# Patient Record
Sex: MALE | Race: WHITE
[De-identification: names, ages, dates, MRNs, and addresses within clinical notes are randomized per-mention and may not be internally consistent; named-entity substitution may affect disease eponyms.]

---

## 2020-03-19 ENCOUNTER — HOSPITAL ENCOUNTER (EMERGENCY)
Dept: HOSPITAL 8 - ED | Age: 55
LOS: 1 days | Discharge: HOME | End: 2020-03-20
Payer: COMMERCIAL

## 2020-03-19 VITALS — DIASTOLIC BLOOD PRESSURE: 92 MMHG | SYSTOLIC BLOOD PRESSURE: 157 MMHG

## 2020-03-19 VITALS — WEIGHT: 255.52 LBS | HEIGHT: 72 IN | BODY MASS INDEX: 34.61 KG/M2

## 2020-03-19 DIAGNOSIS — R10.9: ICD-10-CM

## 2020-03-19 DIAGNOSIS — K40.90: Primary | ICD-10-CM

## 2020-03-19 PROCEDURE — 76870 US EXAM SCROTUM: CPT

## 2020-03-19 PROCEDURE — 93975 VASCULAR STUDY: CPT

## 2020-03-19 PROCEDURE — 99284 EMERGENCY DEPT VISIT MOD MDM: CPT

## 2020-03-19 NOTE — NUR
THIS IS A 53 YO M W/ C/O RT GROIN PAIN 9/10 SINCE 3/1/20. PT REPORTS HE WAS 
LIFTING BOXES AT WORK WHEN IT STARTED. HAS GOTTEN PROGRESSIVELY WORSE. PT 
AMBULATED TO THE ROOM W/ A STEADY GAIT. VS STABLE. NADN. PT IS RESTING ON 
GURNEY IN GOWN W/ CALL LIGHT IN REACH AWAITING US.

## 2020-03-20 ENCOUNTER — OCCUPATIONAL MEDICINE (OUTPATIENT)
Dept: URGENT CARE | Facility: CLINIC | Age: 55
End: 2020-03-20
Payer: COMMERCIAL

## 2020-03-20 VITALS
TEMPERATURE: 98.7 F | SYSTOLIC BLOOD PRESSURE: 138 MMHG | HEIGHT: 72 IN | OXYGEN SATURATION: 95 % | BODY MASS INDEX: 34.64 KG/M2 | HEART RATE: 94 BPM | WEIGHT: 255.73 LBS | DIASTOLIC BLOOD PRESSURE: 82 MMHG

## 2020-03-20 DIAGNOSIS — K40.90 RIGHT INGUINAL HERNIA: ICD-10-CM

## 2020-03-20 PROCEDURE — 99204 OFFICE O/P NEW MOD 45 MIN: CPT | Performed by: PREVENTIVE MEDICINE

## 2020-03-20 NOTE — LETTER
[facility]  [address] [facility], [address] [place] [address]  Phone:  [phone] - Fax:  [phone]   [facility] Progress Report and Disability Certification  Date of Service: 3/20/2020   No Show:  No  Date / Time of Next Visit: 4/14/2020 @ 11:15 am   Claim Information   Patient Name: Tej Ricks  Claim Number:     Employer: [employer]  Date of Injury: 3/1/2020     Insurer / TPA: [employer]  ID / SSN:     Occupation: FULLFILLMENT SPECIALIST  Diagnosis: The encounter diagnosis was Right inguinal hernia.    Medical Information   Related to Industrial Injury? Yes    Subjective Complaints:  DOI 3/1/2020: 53 yo male presents with groin strain.  He is picking up austin litter and felt sudden pain in the groin.  He states he thought the pain would eventually go away but continue to worsen over the next week or 2.  Eventually presented to [facility], where ultrasound was performed and showed right inguinal hernia per patient report.  Patient continues in the right groin pain and some bulging.  Pain worse with bending and twisting.   Objective Findings: Abdomen: Bowel sounds x4.  Tenderness in the right inguinal canal, small defect felt.    Pre-Existing Condition(s):     Assessment:   Condition Same    Status: Additional Care Required  Permanent Disability:No    Plan:      Diagnostics:      Comments:  Referral to general surgery  OTC ibuprofen/Tylenol as needed for pain  Restricted duty  Follow-up 3 weeks    Disability Information   Status: Released to Restricted Duty    From:  3/20/2020  Through: 4/14/2020 Restrictions are: Temporary   Physical Restrictions   Sitting:    Standing:    Stooping:  < or = to 1 hr/day Bending:  < or = to 1 hr/day   Squatting:    Walking:    Climbing:    Pushing:      Pulling:    Other:    Reaching Above Shoulder (L):   Reaching Above Shoulder (R):       Reaching Below Shoulder (L):    Reaching Below Shoulder (R):      Not to  exceed Weight Limits   Carrying(hrs):   Weight Limit(lb): < or = to 10 pounds Lifting(hrs):   Weight  Limit(lb): < or = to 10 pounds   Comments:      Repetitive Actions   Hands: i.e. Fine Manipulations from Grasping:     Feet: i.e. Operating Foot Controls:     Driving / Operate Machinery:     Physician Name: Johan Morgan D.O. Physician Signature: wardSignJOHAN MORGAN D.O. e-Signature: Dr. Denver Meek, Medical Director   Clinic Name / Location: [address], [address] [place] [address] Clinic Phone Number: Dept: [phone]   Appointment Time: 2:45 Pm Visit Start Time: 2:23 PM   Check-In Time:  2:19 Pm Visit Discharge Time: 2:48 pm   Original-Treating Physician or Chiropractor    Page 2-Insurer/TPA    Page 3-Employer    Page 4-Employee

## 2020-03-20 NOTE — PROGRESS NOTES
Subjective:      Tej Ricks is a 54 y.o. male who presents with Other (WC DOI 3/1/20 groin, feeling worse. room 5)      DOI 3/1/2020: 55 yo male presents with groin strain.  He is picking up austin litter and felt sudden pain in the groin.  He states he thought the pain would eventually go away but continue to worsen over the next week or 2.  Eventually presented to [facility], where ultrasound was performed and showed right inguinal hernia per patient report.  Patient continues in the right groin pain and some bulging.  Pain worse with bending and twisting.     HPI    ROS  ROS: All systems were reviewed on intake form, form was reviewed and signed. See scanned documents in media. Pertinent positives and negatives included in HPI.    PMH: No pertinent past medical history to this problem  MEDS: Medications were reviewed in Epic  ALLERGIES:   Allergies   Allergen Reactions   • Nkda [No Known Drug Allergy]      SOCHX: Works as a  at [email or web address]  FH: No pertinent family history to this problem     Objective:     /82   Pulse 94   Temp 37.1 °C (98.7 °F)   Ht 1.829 m (6')   Wt 116 kg (255 lb 11.7 oz)   SpO2 95%   BMI 34.68 kg/m²      Physical Exam  Constitutional:       Appearance: Normal appearance.   HENT:      Right Ear: External ear normal.      Left Ear: External ear normal.      Nose: Nose normal.      Mouth/Throat:      Mouth: Mucous membranes are moist.      Pharynx: Oropharynx is clear.   Cardiovascular:      Rate and Rhythm: Normal rate.   Pulmonary:      Effort: Pulmonary effort is normal.   Skin:     General: Skin is warm and dry.   Neurological:      General: No focal deficit present.      Mental Status: He is alert and oriented to person, place, and time.   Psychiatric:         Mood and Affect: Mood normal.         Thought Content: Thought content normal.         Judgment: Judgment normal.         Abdomen: Bowel sounds x4.  Tenderness in the right inguinal canal, small  defect felt.        Assessment/Plan:       1. Right inguinal hernia  Referral to general surgery  OTC ibuprofen/Tylenol as needed for pain  Restricted duty  Follow-up 3 weeks

## 2020-04-02 PROCEDURE — 99284 EMERGENCY DEPT VISIT MOD MDM: CPT

## 2020-04-03 ENCOUNTER — APPOINTMENT (OUTPATIENT)
Dept: RADIOLOGY | Facility: MEDICAL CENTER | Age: 55
End: 2020-04-03
Attending: EMERGENCY MEDICINE
Payer: COMMERCIAL

## 2020-04-03 ENCOUNTER — HOSPITAL ENCOUNTER (EMERGENCY)
Facility: MEDICAL CENTER | Age: 55
End: 2020-04-03
Attending: EMERGENCY MEDICINE
Payer: COMMERCIAL

## 2020-04-03 VITALS
SYSTOLIC BLOOD PRESSURE: 135 MMHG | DIASTOLIC BLOOD PRESSURE: 76 MMHG | RESPIRATION RATE: 16 BRPM | OXYGEN SATURATION: 95 % | TEMPERATURE: 98.4 F | WEIGHT: 255.95 LBS | HEART RATE: 74 BPM | HEIGHT: 72 IN | BODY MASS INDEX: 34.67 KG/M2

## 2020-04-03 DIAGNOSIS — K40.90 RIGHT INGUINAL HERNIA: ICD-10-CM

## 2020-04-03 PROCEDURE — A9270 NON-COVERED ITEM OR SERVICE: HCPCS | Performed by: EMERGENCY MEDICINE

## 2020-04-03 PROCEDURE — 76870 US EXAM SCROTUM: CPT

## 2020-04-03 PROCEDURE — 700102 HCHG RX REV CODE 250 W/ 637 OVERRIDE(OP): Performed by: EMERGENCY MEDICINE

## 2020-04-03 RX ORDER — OXYCODONE HYDROCHLORIDE AND ACETAMINOPHEN 5; 325 MG/1; MG/1
1-2 TABLET ORAL EVERY 4 HOURS PRN
Qty: 15 TAB | Refills: 0 | Status: SHIPPED | OUTPATIENT
Start: 2020-04-03 | End: 2020-04-06

## 2020-04-03 RX ORDER — IBUPROFEN 600 MG/1
600 TABLET ORAL EVERY 6 HOURS PRN
Qty: 30 TAB | Refills: 0 | Status: ON HOLD | OUTPATIENT
Start: 2020-04-03 | End: 2020-06-23

## 2020-04-03 RX ORDER — OXYCODONE AND ACETAMINOPHEN 10; 325 MG/1; MG/1
1 TABLET ORAL ONCE
Status: COMPLETED | OUTPATIENT
Start: 2020-04-03 | End: 2020-04-03

## 2020-04-03 RX ADMIN — OXYCODONE HYDROCHLORIDE AND ACETAMINOPHEN 1 TABLET: 10; 325 TABLET ORAL at 00:27

## 2020-04-03 NOTE — LETTER
[facility], EMERGENCY DEPT   [address], [place] [address]  Phone: Dept: [phone] - Fax:        [facility] Progress Report and Disability Certification  Date of Service: 4/2/2020   No Show:  No  Date / Time of Next Visit: 4/10/2020   Claim Information   Patient Name: Tej Ricks  Claim Number:     Employer: [employer]  Date of Injury: 3/1/2020     Insurer / TPA: [employer] ID / SSN: [id number]   Occupation: FULLFILLMENT SPECIALIST Diagnosis: The encounter diagnosis was Right inguinal hernia.    Medical Information   Related to Industrial Injury?   Comments:Unknown, could be related to heavy lifting. ***   Subjective Complaints:  Inguinal pain   Objective Findings: Right inguinal hernia containing fat.   Pre-Existing Condition(s): None   Assessment:   Condition Same    Status: Additional Care Required  Comments:Follow-up with general surgery  Permanent Disability:No    Plan: MedicationTransfer Care    Diagnostics:   Comments:Ultrasound    Comments:       Disability Information   Status: Released to Restricted Duty  Comments:I recommend occupational health follow-up for long-term restrictions.    From:  4/3/2020  Through: 4/10/2020 Restrictions are: Temporary   Physical Restrictions   Sitting:    Standing:    Stooping:    Bending:      Squatting:    Walking:    Climbing:    Pushing:      Pulling:    Other:    Reaching Above Shoulder (L):   Reaching Above Shoulder (R):       Reaching Below Shoulder (L):    Reaching Below Shoulder (R):      Not to exceed Weight Limits   Carrying(hrs):   Weight Limit(lb):   Lifting(hrs):   Weight  Limit(lb):     Comments: The patient has a persistent fat-containing hernia that will be worse with prolonged standing, walking or any lifting.  Therefore his job should be limited to things that he can participate with in a seated position.  If this is not possible then he needs to be removed from work  entirely.    Repetitive Actions   Hands: i.e. Fine Manipulations from Grasping:     Feet: i.e. Operating Foot Controls:     Driving / Operate Machinery:     Physician Name: Damian Bernard Physician Signature: DAMIAN Paniagua M.D. e-Signature:  MD, Medical Director   Clinic Name / Location: [facility], EMERGENCY DEPT  [address] [place] [address]  [phone]     Clinic Phone Number: Dept: [phone]   Appointment Time:  Visit Start Time:    Check-In Time:  11:50 PM Visit Discharge Time:    Original-Treating Physician or Chiropractor    Page 2-Insurer/TPA    Page 3-Employer    Page 4-Employee

## 2020-04-03 NOTE — ED NOTES
The patient has been provided with discharge education and information.  The patient was also provided with instructions on follow up care and return precautions.  The patient verbalizes understanding of discharge instructions, follow up care, and return precautions.  All questions have been answered.  Two RX written by BRITTA.  NAD, A/Brigid, good color and appropriate at time of discharge.  Patient ambulated steady gait out of department.      Patient signed narcotic form, verbalized understanding.

## 2020-04-03 NOTE — ED PROVIDER NOTES
----- Message from Supriya Sebastian sent at 9/26/2019 12:14 PM CDT -----  Contact: Rosa [phone]  Patient is calling for an RX refill or new RX.  Is this a refill or new RX:  refill  RX name and strength: metFORMIN (GLUCOPHAGE) 500 MG tablet  Is this a 30 day or 90 day RX:  90  Pharmacy name and phone # (copy/paste from chart):   [id number] [place] [address] [facility] AT [address]    Please call and advise.    Thank You     ED Provider Note    ED Provider Note    Primary care provider: Pcp Pt States None  Means of arrival: Walk-in  History obtained from: Patient    CHIEF COMPLAINT  Chief Complaint   Patient presents with   • Hernia     left inguinal pain, DX March 1, 2019     Seen at 12:11 AM.   HPI  Tej Ricks is a 54 y.o. adult who presents to the Emergency Department with constant throbbing moderate right inguinal pain for the past several weeks.  The pain is improved with a recumbent position.  He went to [place] emergency department 2 weeks ago, he had an ultrasound that showed he had an inguinal hernia.  He was given a list of surgeons to follow-up with but they have not contacted him for an appointment.  He went to his work and they apparently wanted him to see a surgeon related to [facility], therefore he went to the [facility] urgent care.  He had a second examination there that confirmed a hernia but he still has not heard from any surgeon or given any contact information.  He states that he cannot go to work because doing any type of work increases the pain.  He has predominantly been laying in bed because the recumbent position alleviates the pain.  He would like something to alleviate his pain and possibly a surgical referral.      He denies any abdominal surgeries, constipation, numbness, weakness or dysuria.    REVIEW OF SYSTEMS  See HPI,   Remainder of ROS negative.     PAST MEDICAL HISTORY   has a past medical history of Pain.    SURGICAL HISTORY   has a past surgical history that includes ulnar nerve transfer (2004); foreign body removal (2/12/2010); and other orthopedic surgery.    SOCIAL HISTORY  Social History     Tobacco Use   • Smoking status: Never Smoker   • Smokeless tobacco: Never Used   Substance Use Topics   • Alcohol use: No   • Drug use: No      Social History     Substance and Sexual Activity   Drug Use No       FAMILY HISTORY  Family History   Problem Relation Age of Onset   • Non-contributory Neg Hx         CURRENT MEDICATIONS  Reviewed.  See Encounter Summary.     ALLERGIES  Allergies   Allergen Reactions   • Nkda [No Known Drug Allergy]        PHYSICAL EXAM  VITAL SIGNS: /73   Pulse 68   Temp 35.8 °C (96.5 °F) (Temporal)   Resp 18   Ht 1.829 m (6')   Wt 116.1 kg (255 lb 15.3 oz)   SpO2 93%   BMI 34.71 kg/m²   Constitutional: Awake, alert in no apparent distress.  HENT: Normocephalic, Bilateral external ears normal. Nose normal.   Eyes: Conjunctiva normal, non-icteric, EOMI.    Thorax & Lungs: Easy unlabored respirations, Clear to ascultation bilaterally.  Cardiovascular: Regular rate, Regular rhythm, No murmurs, rubs or gallops.  Abdomen:  Soft, nontender, nondistended, normal active bowel sounds.   : Testicles descended bilaterally and are nontender, the patient has tenderness posterior to the scrotum and in the right inguinal region, I do not see any obvious hernia.  Skin: Visualized skin is  Dry, No erythema, No rash.   Musculoskeletal:   No cyanosis, clubbing or edema.  Neurologic: Alert, Grossly non-focal.   Psychiatric: Normal affect, Normal mood  Lymphatic:  No cervical LAD        RADIOLOGY  [id number]   Final Result      1.  Nonreproducible right inguinal hernia containing fat.      2.  Small bilateral hydroceles.            COURSE & MEDICAL DECISION MAKING  Pertinent Labs & Imaging studies reviewed. (See chart for details)        12:11 AM - Medical record reviewed, patient seen and examined at bedside.    Decision Making:  This is a 54 y.o. year old adult who presents with a painful swelling in the right inguinal region and prior diagnosis of hernia.  I was unable to appreciate a hernia on examination, therefore I did order an ultrasound to verify this.  The patient does have a fat hernia, this does not need to be emergently reduced or require emergent surgery as he is not at any risk for having bowel ischemia.  Often this does cause some significant discomfort and I do feel  that he will require outpatient follow-up with a surgeon and have repair.  Due to the Covid-19 epidemic all elective surgeries have been canceled, this may fall under urgent surgery in which case he can get a repair.    In prescribing controlled substances to this patient, I certify that I have obtained and reviewed the medical history of Tej Ricks. I have also made a good edward effort to obtain applicable records from other providers who have treated the patient and records did not demonstrate any increased risk of substance abuse that would prevent me from prescribing controlled substances.     I have conducted a physical exam and documented it. I have reviewed Ms. Ricks’s prescription history as maintained by the [place] Prescription Monitoring Program.     I have assessed the patient’s risk for abuse, dependency, and addiction using the validated Opioid Risk Tool available at https://www.mdcalc.com/vicolo-vsce-txww-ort-narcotic-abuse.     Given the above, I believe the benefits of controlled substance therapy outweigh the risks. The reasons for prescribing controlled substances include non-narcotic, oral analgesic alternatives have been inadequate for pain control. Accordingly, I have discussed the risk and benefits, treatment plan, and alternative therapies with the patient.         Discharge Medications:  New Prescriptions    IBUPROFEN (MOTRIN) 600 MG TAB    Take 1 Tab by mouth every 6 hours as needed.    OXYCODONE-ACETAMINOPHEN (PERCOCET) 5-325 MG TAB    Take 1-2 Tabs by mouth every four hours as needed for up to 3 days.       The patient was discharged home (see d/c instructions) and parent was told to return immediately for any signs or symptoms listed, or any worsening at all.  The patient's parent verbally agreed to the discharge precautions and follow-up plan which is documented in EPIC.    The patient's blood pressure is elevated today. >120/80. I have referred them to primary care for follow up.        FINAL IMPRESSION  1. Right inguinal hernia

## 2020-04-03 NOTE — ED TRIAGE NOTES
Tej Ricks  54 y.o.  Chief Complaint   Patient presents with   • Hernia     left inguinal pain, DX March 1, 2019     Patient to triage from lobby with above complaints.  Pt states he was DX with hernia on 3/1/2019 and needs surgery but per pt surgeon hasn't called to schedule yet.  Pt unsure how to control pain at this time.      Vitals:    04/02/20 2350   BP: (!) 172/106   Pulse: 84   Resp: 18   Temp: 35.8 °C (96.5 °F)   SpO2: 98%       Triage process explained to patient, apologized for wait time, and returned to lobby.  Pt informed to notify staff of any change in condition. NAD at this time.

## 2020-04-14 ENCOUNTER — OCCUPATIONAL MEDICINE (OUTPATIENT)
Dept: URGENT CARE | Facility: CLINIC | Age: 55
End: 2020-04-14
Payer: COMMERCIAL

## 2020-04-14 VITALS
WEIGHT: 250 LBS | RESPIRATION RATE: 14 BRPM | DIASTOLIC BLOOD PRESSURE: 84 MMHG | SYSTOLIC BLOOD PRESSURE: 126 MMHG | BODY MASS INDEX: 33.86 KG/M2 | HEIGHT: 72 IN

## 2020-04-14 DIAGNOSIS — K40.90 RIGHT INGUINAL HERNIA: ICD-10-CM

## 2020-04-14 PROCEDURE — 99212 OFFICE O/P EST SF 10 MIN: CPT | Performed by: PREVENTIVE MEDICINE

## 2020-04-14 ASSESSMENT — PAIN SCALES - GENERAL: PAINLEVEL: 9=SEVERE PAIN

## 2020-04-14 NOTE — PROGRESS NOTES
Subjective:     Tej Ricks is a 54 y.o. adult who presents for Follow-Up (WC DOI 04/20/20 [email or web address] - Same - RM 06)      DOI 3/1/2020: 53 yo male presents with groin strain.  He is picking up austin litter and felt sudden pain in the groin.  He states he thought the pain would eventually go away but continue to worsen over the next week or 2.  He recently returned to the ER where a small nonreducible hernia was noted on ultrasound.    ROS         Objective:     /84   Resp 14   Ht 1.829 m (6')   Wt 113.4 kg (250 lb)   BMI 33.91 kg/m²     Constitutional: Patient is in no acute distress. Appears well-developed and well-nourished.   Cardiovascular: Normal rate.    Pulmonary/Chest: Effort normal. No respiratory distress.   Neurological: Patient is alert and oriented to person, place, and time.   Skin: Skin is warm and dry.   Psychiatric: Normal mood and affect. Behavior is normal.     Abdomen: Bowel sounds x4.  Tenderness in the right inguinal canal.    Assessment/Plan:       1. Right inguinal hernia    • Referral to general surgery pending  • OTC ibuprofen/Tylenol as needed for pain  • Restricted duty  • Follow-up 3 weeks    Differential diagnosis, natural history, supportive care, and indications for immediate follow-up discussed.

## 2020-04-14 NOTE — LETTER
[facility]  [address] [facility], [address] - [place] [address]  Phone:  [phone] - Fax:  [phone]   [facility] Progress Report and Disability Certification  Date of Service: 4/14/2020   No Show:  No  Date / Time of Next Visit: 5/12/2020@ 11:15 am   Claim Information   Patient Name: Tej Ricks  Claim Number:     Employer: [employer]  Date of Injury: 3/1/2020     Insurer / TPA: [employer]  ID / SSN:     Occupation: FULLFILLMENT SPECIALIST  Diagnosis: The encounter diagnosis was Right inguinal hernia.    Medical Information   Related to Industrial Injury? Yes    Subjective Complaints:  DOI 3/1/2020: 53 yo male presents with groin strain.  He is picking up austin litter and felt sudden pain in the groin.  He states he thought the pain would eventually go away but continue to worsen over the next week or 2.  He recently returned to the ER where a small nonreducible hernia was noted on ultrasound.  Patient states that overall symptoms about the same.  He continues to note pain in the groin.  He states pain is relieved with pain in the recumbent position.  He takes occasional Tylenol for relief.   Objective Findings: Abdomen: Bowel sounds x4.  Tenderness in the right inguinal canal.   Pre-Existing Condition(s):     Assessment:   Condition Same    Status: Additional Care Required  Permanent Disability:No    Plan:      Diagnostics:      Comments:  Referral to general surgery pending  OTC ibuprofen/Tylenol as needed for pain  Restricted duty  Follow-up 3 weeks if not seen by general surgery    Disability Information   Status: Released to Restricted Duty    From:  4/14/2020  Through: 5/12/2020 Restrictions are: Temporary   Physical Restrictions   Sitting:    Standing:  < or = to 2 hrs/day Stooping:    Bending:      Squatting:    Walking:  < or = to 2 hrs/day Climbing:    Pushing:  < or = to 1 hr/day   Pulling:  < or = to 1 hr/day Other:    Reaching Above  Shoulder (L):   Reaching Above Shoulder (R):       Reaching Below Shoulder (L):    Reaching Below Shoulder (R):      Not to exceed Weight Limits   Carrying(hrs):   Weight Limit(lb): < or = to 10 pounds Lifting(hrs):   Weight  Limit(lb): < or = to 10 pounds   Comments:      Repetitive Actions   Hands: i.e. Fine Manipulations from Grasping:     Feet: i.e. Operating Foot Controls:     Driving / Operate Machinery:     Physician Name: Johan Jacob D.O. Physician Signature: JOHAN Hernandez D.O. e-Signature: Dr. Denver Meek, Medical Director   Clinic Name / Location: [address], [address]  [place] [address] Clinic Phone Number: Dept: [phone]   Appointment Time: 11:15 Am Visit Start Time: 11:15 AM   Check-In Time:  11:11 Am Visit Discharge Time: 11:39 am    Original-Treating Physician or Chiropractor    Page 2-Insurer/TPA    Page 3-Employer    Page 4-Employee

## 2020-04-14 NOTE — NON-PROVIDER
Spoke w/ Drug Screen Coordinator no post accident testing was done @ the ER. Patient does not have an auth form w/him and per [facility] Drug Screen w/ Auth only.

## 2020-06-18 ENCOUNTER — OFFICE VISIT (OUTPATIENT)
Dept: ADMISSIONS | Facility: MEDICAL CENTER | Age: 55
End: 2020-06-18
Attending: SURGERY
Payer: COMMERCIAL

## 2020-06-18 DIAGNOSIS — Z01.812 PRE-OPERATIVE LABORATORY EXAMINATION: ICD-10-CM

## 2020-06-18 LAB — COVID ORDER STATUS COVID19: NORMAL

## 2020-06-18 PROCEDURE — U0003 INFECTIOUS AGENT DETECTION BY NUCLEIC ACID (DNA OR RNA); SEVERE ACUTE RESPIRATORY SYNDROME CORONAVIRUS 2 (SARS-COV-2) (CORONAVIRUS DISEASE [COVID-19]), AMPLIFIED PROBE TECHNIQUE, MAKING USE OF HIGH THROUGHPUT TECHNOLOGIES AS DESCRIBED BY CMS-2020-01-R: HCPCS

## 2020-06-18 PROCEDURE — C9803 HOPD COVID-19 SPEC COLLECT: HCPCS

## 2020-06-19 LAB
SARS-COV-2 RNA RESP QL NAA+PROBE: NOTDETECTED
SPECIMEN SOURCE: NORMAL

## 2020-06-22 NOTE — OR NURSING
COVID-19 Pre-surgery screenin. Do you have an undiagnosed respiratory illness or symptoms such as coughing or sneezing? no (Yes/No)  a. Onset of Sx no  b. Acute vs. chronic respiratory illness no    2. Do you have an unexplained fever greater than 100.4 degrees Fahrenheit or 38 degrees Celsius?     no (Yes/No)    3. Have you had direct exposure to a patient who tested positive for Covid-19?    no (Yes/No)    4. Have you traveled outside [facility] in the last 14 days? no    5. Have you had any loss of your sense of taste or smell? Have you had N/V or sore throat? no    Patient has been informed of visitor policy and asked to wear a mask upon entering the hospital   yes(Yes/No)

## 2020-06-23 ENCOUNTER — ANESTHESIA (OUTPATIENT)
Dept: SURGERY | Facility: MEDICAL CENTER | Age: 55
End: 2020-06-23
Payer: COMMERCIAL

## 2020-06-23 ENCOUNTER — ANESTHESIA EVENT (OUTPATIENT)
Dept: SURGERY | Facility: MEDICAL CENTER | Age: 55
End: 2020-06-23
Payer: COMMERCIAL

## 2020-06-23 ENCOUNTER — HOSPITAL ENCOUNTER (OUTPATIENT)
Facility: MEDICAL CENTER | Age: 55
End: 2020-06-23
Attending: SURGERY | Admitting: SURGERY
Payer: COMMERCIAL

## 2020-06-23 VITALS
TEMPERATURE: 97 F | HEIGHT: 72 IN | WEIGHT: 262.35 LBS | DIASTOLIC BLOOD PRESSURE: 83 MMHG | BODY MASS INDEX: 35.53 KG/M2 | SYSTOLIC BLOOD PRESSURE: 127 MMHG | RESPIRATION RATE: 18 BRPM | OXYGEN SATURATION: 91 % | HEART RATE: 76 BPM

## 2020-06-23 DIAGNOSIS — K40.90 INGUINAL HERNIA OF RIGHT SIDE WITHOUT OBSTRUCTION OR GANGRENE: Primary | ICD-10-CM

## 2020-06-23 DIAGNOSIS — G89.18 POSTOPERATIVE PAIN: ICD-10-CM

## 2020-06-23 PROCEDURE — A9270 NON-COVERED ITEM OR SERVICE: HCPCS | Performed by: ANESTHESIOLOGY

## 2020-06-23 PROCEDURE — 700102 HCHG RX REV CODE 250 W/ 637 OVERRIDE(OP): Performed by: SURGERY

## 2020-06-23 PROCEDURE — 502240 HCHG MISC OR SUPPLY RC 0272: Performed by: SURGERY

## 2020-06-23 PROCEDURE — A9270 NON-COVERED ITEM OR SERVICE: HCPCS | Performed by: SURGERY

## 2020-06-23 PROCEDURE — 700102 HCHG RX REV CODE 250 W/ 637 OVERRIDE(OP): Performed by: ANESTHESIOLOGY

## 2020-06-23 PROCEDURE — 501568 HCHG TROCAR, BLUNTPORT 12MM: Performed by: SURGERY

## 2020-06-23 PROCEDURE — 160048 HCHG OR STATISTICAL LEVEL 1-5: Performed by: SURGERY

## 2020-06-23 PROCEDURE — 501838 HCHG SUTURE GENERAL: Performed by: SURGERY

## 2020-06-23 PROCEDURE — 502000 HCHG MISC OR IMPLANTS RC 0278: Performed by: SURGERY

## 2020-06-23 PROCEDURE — 160047 HCHG PACU  - EA ADDL 30 MINS PHASE II: Performed by: SURGERY

## 2020-06-23 PROCEDURE — 700101 HCHG RX REV CODE 250: Performed by: SURGERY

## 2020-06-23 PROCEDURE — 700101 HCHG RX REV CODE 250: Performed by: ANESTHESIOLOGY

## 2020-06-23 PROCEDURE — 160025 RECOVERY II MINUTES (STATS): Performed by: SURGERY

## 2020-06-23 PROCEDURE — 160002 HCHG RECOVERY MINUTES (STAT): Performed by: SURGERY

## 2020-06-23 PROCEDURE — 160009 HCHG ANES TIME/MIN: Performed by: SURGERY

## 2020-06-23 PROCEDURE — 700111 HCHG RX REV CODE 636 W/ 250 OVERRIDE (IP): Performed by: ANESTHESIOLOGY

## 2020-06-23 PROCEDURE — 160028 HCHG SURGERY MINUTES - 1ST 30 MINS LEVEL 3: Performed by: SURGERY

## 2020-06-23 PROCEDURE — 700105 HCHG RX REV CODE 258: Performed by: ANESTHESIOLOGY

## 2020-06-23 PROCEDURE — 64486 TAP BLOCK UNIL BY INJECTION: CPT | Performed by: SURGERY

## 2020-06-23 PROCEDURE — 700105 HCHG RX REV CODE 258: Performed by: SURGERY

## 2020-06-23 PROCEDURE — 501399 HCHG SPECIMAN BAG, ENDO CATC: Performed by: SURGERY

## 2020-06-23 PROCEDURE — A6402 STERILE GAUZE <= 16 SQ IN: HCPCS | Performed by: SURGERY

## 2020-06-23 PROCEDURE — 160039 HCHG SURGERY MINUTES - EA ADDL 1 MIN LEVEL 3: Performed by: SURGERY

## 2020-06-23 PROCEDURE — 501582 HCHG TROCAR, THRD BLADED: Performed by: SURGERY

## 2020-06-23 PROCEDURE — 502571 HCHG PACK, LAP CHOLE: Performed by: SURGERY

## 2020-06-23 PROCEDURE — 160036 HCHG PACU - EA ADDL 30 MINS PHASE I: Performed by: SURGERY

## 2020-06-23 PROCEDURE — 160046 HCHG PACU - 1ST 60 MINS PHASE II: Performed by: SURGERY

## 2020-06-23 PROCEDURE — 160035 HCHG PACU - 1ST 60 MINS PHASE I: Performed by: SURGERY

## 2020-06-23 DEVICE — MESH LAP PROGRIP FLAT SHEET 16 X 12: Type: IMPLANTABLE DEVICE | Site: GROIN | Status: FUNCTIONAL

## 2020-06-23 RX ORDER — MIDAZOLAM HYDROCHLORIDE 1 MG/ML
INJECTION INTRAMUSCULAR; INTRAVENOUS PRN
Status: DISCONTINUED | OUTPATIENT
Start: 2020-06-23 | End: 2020-06-23 | Stop reason: SURG

## 2020-06-23 RX ORDER — BUPIVACAINE HYDROCHLORIDE AND EPINEPHRINE 5; 5 MG/ML; UG/ML
INJECTION, SOLUTION EPIDURAL; INTRACAUDAL; PERINEURAL PRN
Status: DISCONTINUED | OUTPATIENT
Start: 2020-06-23 | End: 2020-06-23 | Stop reason: SURG

## 2020-06-23 RX ORDER — SODIUM CHLORIDE, SODIUM LACTATE, POTASSIUM CHLORIDE, CALCIUM CHLORIDE 600; 310; 30; 20 MG/100ML; MG/100ML; MG/100ML; MG/100ML
INJECTION, SOLUTION INTRAVENOUS CONTINUOUS
Status: DISCONTINUED | OUTPATIENT
Start: 2020-06-23 | End: 2020-06-23 | Stop reason: HOSPADM

## 2020-06-23 RX ORDER — MIDAZOLAM HYDROCHLORIDE 1 MG/ML
1 INJECTION INTRAMUSCULAR; INTRAVENOUS
Status: DISCONTINUED | OUTPATIENT
Start: 2020-06-23 | End: 2020-06-23 | Stop reason: HOSPADM

## 2020-06-23 RX ORDER — BUPIVACAINE HYDROCHLORIDE AND EPINEPHRINE 5; 5 MG/ML; UG/ML
INJECTION, SOLUTION EPIDURAL; INTRACAUDAL; PERINEURAL
Status: DISCONTINUED | OUTPATIENT
Start: 2020-06-23 | End: 2020-06-23 | Stop reason: HOSPADM

## 2020-06-23 RX ORDER — OXYCODONE HCL 5 MG/5 ML
10 SOLUTION, ORAL ORAL
Status: COMPLETED | OUTPATIENT
Start: 2020-06-23 | End: 2020-06-23

## 2020-06-23 RX ORDER — DOCUSATE SODIUM 100 MG/1
100 CAPSULE, LIQUID FILLED ORAL 2 TIMES DAILY
Qty: 15 CAP | Refills: 3 | Status: SHIPPED | OUTPATIENT
Start: 2020-06-23

## 2020-06-23 RX ORDER — HYDROCODONE BITARTRATE AND ACETAMINOPHEN 5; 325 MG/1; MG/1
1-2 TABLET ORAL EVERY 6 HOURS PRN
Qty: 20 TAB | Refills: 0 | Status: SHIPPED | OUTPATIENT
Start: 2020-06-23 | End: 2020-06-26

## 2020-06-23 RX ORDER — CELECOXIB 200 MG/1
400 CAPSULE ORAL ONCE
Status: COMPLETED | OUTPATIENT
Start: 2020-06-23 | End: 2020-06-23

## 2020-06-23 RX ORDER — ONDANSETRON 4 MG/1
4 TABLET, FILM COATED ORAL EVERY 4 HOURS PRN
Qty: 20 TAB | Refills: 3 | Status: SHIPPED | OUTPATIENT
Start: 2020-06-23

## 2020-06-23 RX ORDER — OXYCODONE HCL 5 MG/5 ML
5 SOLUTION, ORAL ORAL
Status: COMPLETED | OUTPATIENT
Start: 2020-06-23 | End: 2020-06-23

## 2020-06-23 RX ORDER — GABAPENTIN 300 MG/1
300 CAPSULE ORAL ONCE
Status: COMPLETED | OUTPATIENT
Start: 2020-06-23 | End: 2020-06-23

## 2020-06-23 RX ORDER — HALOPERIDOL 5 MG/ML
1 INJECTION INTRAMUSCULAR
Status: DISCONTINUED | OUTPATIENT
Start: 2020-06-23 | End: 2020-06-23 | Stop reason: HOSPADM

## 2020-06-23 RX ORDER — CEFAZOLIN SODIUM 1 G/3ML
INJECTION, POWDER, FOR SOLUTION INTRAMUSCULAR; INTRAVENOUS PRN
Status: DISCONTINUED | OUTPATIENT
Start: 2020-06-23 | End: 2020-06-23 | Stop reason: SURG

## 2020-06-23 RX ORDER — ACETAMINOPHEN 500 MG
1000 TABLET ORAL ONCE
Status: COMPLETED | OUTPATIENT
Start: 2020-06-23 | End: 2020-06-23

## 2020-06-23 RX ORDER — ROCURONIUM BROMIDE 10 MG/ML
INJECTION, SOLUTION INTRAVENOUS PRN
Status: DISCONTINUED | OUTPATIENT
Start: 2020-06-23 | End: 2020-06-23 | Stop reason: SURG

## 2020-06-23 RX ORDER — DEXAMETHASONE SODIUM PHOSPHATE 4 MG/ML
INJECTION, SOLUTION INTRA-ARTICULAR; INTRALESIONAL; INTRAMUSCULAR; INTRAVENOUS; SOFT TISSUE PRN
Status: DISCONTINUED | OUTPATIENT
Start: 2020-06-23 | End: 2020-06-23 | Stop reason: SURG

## 2020-06-23 RX ORDER — ONDANSETRON 2 MG/ML
INJECTION INTRAMUSCULAR; INTRAVENOUS PRN
Status: DISCONTINUED | OUTPATIENT
Start: 2020-06-23 | End: 2020-06-23 | Stop reason: SURG

## 2020-06-23 RX ORDER — LIDOCAINE HYDROCHLORIDE 20 MG/ML
INJECTION, SOLUTION EPIDURAL; INFILTRATION; INTRACAUDAL; PERINEURAL PRN
Status: DISCONTINUED | OUTPATIENT
Start: 2020-06-23 | End: 2020-06-23 | Stop reason: SURG

## 2020-06-23 RX ADMIN — DEXAMETHASONE SODIUM PHOSPHATE 4 MG: 4 INJECTION, SOLUTION INTRA-ARTICULAR; INTRALESIONAL; INTRAMUSCULAR; INTRAVENOUS; SOFT TISSUE at 15:53

## 2020-06-23 RX ADMIN — ACETAMINOPHEN 1000 MG: 500 TABLET ORAL at 13:55

## 2020-06-23 RX ADMIN — PROPOFOL 200 MG: 10 INJECTION, EMULSION INTRAVENOUS at 15:53

## 2020-06-23 RX ADMIN — ONDANSETRON 4 MG: 2 INJECTION INTRAMUSCULAR; INTRAVENOUS at 15:53

## 2020-06-23 RX ADMIN — BUPIVACAINE HYDROCHLORIDE AND EPINEPHRINE BITARTRATE 30 ML: 5; .005 INJECTION, SOLUTION EPIDURAL; INTRACAUDAL; PERINEURAL at 16:05

## 2020-06-23 RX ADMIN — ROCURONIUM BROMIDE 50 MG: 10 INJECTION, SOLUTION INTRAVENOUS at 15:53

## 2020-06-23 RX ADMIN — DEXAMETHASONE SODIUM PHOSPHATE 4 MG: 4 INJECTION, SOLUTION INTRA-ARTICULAR; INTRALESIONAL; INTRAMUSCULAR; INTRAVENOUS; SOFT TISSUE at 16:05

## 2020-06-23 RX ADMIN — SUGAMMADEX 200 MG: 100 INJECTION, SOLUTION INTRAVENOUS at 17:13

## 2020-06-23 RX ADMIN — OXYCODONE HYDROCHLORIDE 10 MG: 5 SOLUTION ORAL at 19:51

## 2020-06-23 RX ADMIN — CEFAZOLIN 3 G: 330 INJECTION, POWDER, FOR SOLUTION INTRAMUSCULAR; INTRAVENOUS at 15:53

## 2020-06-23 RX ADMIN — LIDOCAINE HYDROCHLORIDE 5 ML: 20 INJECTION, SOLUTION EPIDURAL; INFILTRATION; INTRACAUDAL at 15:53

## 2020-06-23 RX ADMIN — ROCURONIUM BROMIDE 20 MG: 10 INJECTION, SOLUTION INTRAVENOUS at 16:33

## 2020-06-23 RX ADMIN — MIDAZOLAM HYDROCHLORIDE 2 MG: 1 INJECTION, SOLUTION INTRAMUSCULAR; INTRAVENOUS at 15:53

## 2020-06-23 RX ADMIN — GABAPENTIN 300 MG: 300 CAPSULE ORAL at 13:55

## 2020-06-23 RX ADMIN — LIDOCAINE HYDROCHLORIDE 0.5 ML: 10 INJECTION, SOLUTION EPIDURAL; INFILTRATION; INTRACAUDAL at 13:55

## 2020-06-23 RX ADMIN — FENTANYL CITRATE 100 MCG: 50 INJECTION INTRAMUSCULAR; INTRAVENOUS at 15:53

## 2020-06-23 RX ADMIN — POVIDONE-IODINE 15 ML: 10 SOLUTION TOPICAL at 13:57

## 2020-06-23 RX ADMIN — CELECOXIB 400 MG: 200 CAPSULE ORAL at 13:55

## 2020-06-23 RX ADMIN — SODIUM CHLORIDE, POTASSIUM CHLORIDE, SODIUM LACTATE AND CALCIUM CHLORIDE: 600; 310; 30; 20 INJECTION, SOLUTION INTRAVENOUS at 13:55

## 2020-06-23 RX ADMIN — SODIUM CHLORIDE, POTASSIUM CHLORIDE, SODIUM LACTATE AND CALCIUM CHLORIDE: 600; 310; 30; 20 INJECTION, SOLUTION INTRAVENOUS at 17:32

## 2020-06-23 ASSESSMENT — PAIN SCALES - GENERAL: PAIN_LEVEL: 0

## 2020-06-23 NOTE — ANESTHESIA PROCEDURE NOTES
Peripheral Block    Date/Time: 6/23/2020 4:02 PM  Performed by: Orlando Tarango M.D.  Authorized by: Orlando Tarango M.D.     Patient Location:  OR  Start Time:  6/23/2020 4:02 PM  End Time:  6/23/2020 4:09 PM  Reason for Block: at surgeon's request and post-op pain management    patient identified, IV checked, site marked, risks and benefits discussed, surgical consent, monitors and equipment checked, pre-op evaluation and timeout performed    Patient Position:  Supine  Prep: ChloraPrep    Monitoring:  Heart rate, continuous pulse ox and cardiac monitor  Block Region:  Trunk  Trunk - Block Type:  Rectus sheath plane block - TAP block and other  Other Block Type: Right side ilioinguinal/iliohypogastric nerve block in addition to RSB  Laterality:  Bilateral  Procedures: ultrasound guided  Image captured, interpreted and electronically stored.  Strength:  1 %  Dose:  3 ml  Block Type:  Single-shot  Needle Length:  100mm  Needle Gauge:  21 G  Needle Localization:  Ultrasound guidance  Injection Assessment:  Negative aspiration for heme, incremental injection and local visualized surrounding nerve on ultrasound  Evidence of intravascular injection: No

## 2020-06-23 NOTE — PROGRESS NOTES
Inguinal Hernia Repair Discharge Instructions:    1. DIET: You may resume your normal preoperative diet.    2. ACTIVITIES: You may resume routine activities. Heavy lifting (over 15 pounds) and strenuous activities should be avoided for one month after surgery. Routine activities of daily living such as walking, going up and down stairs are acceptable.    3. DRIVING: You may drive whenever you are no longer taking narcotic pain medications and are able to perform the activities needed to drive safely, i.e. turning, bending, twisting, etc.    4. BATHING: You have waterproof glue covering your incisions, so you can shower anytime but not baths for 1 week after surgery. The glue will peel off after a few days.      5. BOWEL FUNCTION: Constipation is common after surgery. If you feel this is occurring, take a laxative (Milk of Magnesia, Ex-Lax, Senokot, Miralax, Magnesium Citrate) until the problem has resolved.    6. PAIN MEDICATION: You will be given a prescription for pain medication. Please take these as directed. You may also use Advil/Motrin/Ibuprofen to help reduce pain in addition to your pain medication. It is important to remember not to take medications on an empty stomach as this may cause nausea. Do not consume alcohol while taking pain medication.  You can put ice packs on the groin(s) if it helps for additional pain relief.  Ice should be applied for 20 minutes at a time, with a 20 minute break before reapplying.    7. WHAT TO EXPECT: You may develop swelling and bruising at the base of your genitalia and within the scrotum (males) or labias (females). This is common and may take several weeks to resolve.      8. CALL IF YOU HAVE: (1) Fevers to more than 101.5F, (2) Unusual chest or leg pain, (3) Drainage or fluid from incision that may be foul smelling, increased tenderness or soreness at the wound or the wound edges are no longer together, redness or swelling at the incision site.  (4) Profound swelling  at the incision site or in the genitals.    9. FOLLOW-UP: Call and schedule a follow up appointment in 2 weeks.  If you have any additional questions at all, please do not hesitate to call the office and speak to my medical assistant, myself, or the physician on call.    Juan Manuel Cade MD  [facility]  [address] [place] [address], [address], [place] [address]  [phone]

## 2020-06-23 NOTE — ANESTHESIA PREPROCEDURE EVALUATION
[facility]  Obesity    Relevant Problems   No relevant active problems       Physical Exam    Airway   Mallampati: II  TM distance: >3 FB  Neck ROM: full       Cardiovascular - normal exam  Rhythm: regular  Rate: normal  (-) murmur     Dental - normal exam           Pulmonary - normal exam  Breath sounds clear to auscultation     Abdominal    Neurological - normal exam                 Anesthesia Plan    ASA 2       Plan - general and peripheral nerve block     Peripheral nerve block will be post-op pain control  Airway plan will be ETT        Induction: intravenous    Postoperative Plan: Postoperative administration of opioids is intended.    Pertinent diagnostic labs and testing reviewed    Informed Consent:    Anesthetic plan and risks discussed with patient.

## 2020-06-23 NOTE — ANESTHESIA PROCEDURE NOTES
Airway    Date/Time: 6/23/2020 3:55 PM  Performed by: Orlando Tarango M.D.  Authorized by: Orlando Tarango M.D.     Location:  OR  Urgency:  Elective  Indications for Airway Management:  Anesthesia      Spontaneous Ventilation: absent    Sedation Level:  Deep  Preoxygenated: Yes    Patient Position:  Sniffing  Mask Difficulty Assessment:  0 - not attempted  Final Airway Type:  Endotracheal airway  Final Endotracheal Airway:  ETT  Cuffed: Yes    Technique Used for Successful ETT Placement:  Video laryngoscopy    Insertion Site:  Oral  Blade Type:  Glide  Laryngoscope Blade/Videolaryngoscope Blade Size:  4  ETT Size (mm):  7.5  Measured from:  Teeth  ETT to Teeth (cm):  25  Placement Verified by: auscultation and capnometry    Cormack-Lehane Classification:  Grade I - full view of glottis  Number of Attempts at Approach:  1

## 2020-06-23 NOTE — H&P
General Surgery H&P  -------------------------------------------------------------------------------------------------  Date: 6/23/2020    Surgeon: Juan Manuel Cade M.D. - [facility]  -------------------------------------------------------------------------------------------------    HPI:  This is a 55 y.o. male who is presenting today for elective surgery.  No specific complaints at this time. Questions addressed.      Past Medical History:   Diagnosis Date   • Pain     left forearm 8/10       Past Surgical History:   Procedure Laterality Date   • FOREIGN BODY REMOVAL  2/12/2010    Performed by CASSIA ARAUJO at [facility] ORS   • ULNAR NERVE TRANSFER  2004    left   • OTHER ORTHOPEDIC SURGERY         Current Facility-Administered Medications   Medication Dose Route Frequency Provider Last Rate Last Dose   • lidocaine (XYLOCAINE) 1 % injection 0.5 mL  0.5 mL Intradermal Once PRN Juan Manuel Cade M.D.   0.5 mL at 06/23/20 1355   • lactated ringers infusion   Intravenous Continuous Juan Manuel Cade M.D. 10 mL/hr at 06/23/20 1355         Social History     Socioeconomic History   • Marital status:      Spouse name: Not on file   • Number of children: Not on file   • Years of education: Not on file   • Highest education level: Not on file   Occupational History   • Not on file   Social Needs   • Financial resource strain: Not on file   • Food insecurity     Worry: Not on file     Inability: Not on file   • Transportation needs     Medical: Not on file     Non-medical: Not on file   Tobacco Use   • Smoking status: Never Smoker   • Smokeless tobacco: Never Used   Substance and Sexual Activity   • Alcohol use: No   • Drug use: No   • Sexual activity: Not on file   Lifestyle   • Physical activity     Days per week: Not on file     Minutes per session: Not on file   • Stress: Not on file   Relationships   • Social connections     Talks on phone: Not on file     Gets together: Not on  file     Attends Jehovah's witness service: Not on file     Active member of club or organization: Not on file     Attends meetings of clubs or organizations: Not on file     Relationship status: Not on file   • Intimate partner violence     Fear of current or ex partner: Not on file     Emotionally abused: Not on file     Physically abused: Not on file     Forced sexual activity: Not on file   Other Topics Concern   • Not on file   Social History Narrative   • Not on file       Family History   Problem Relation Age of Onset   • Non-contributory Neg Hx        Allergies:  Nkda [no known drug allergy]    Review of Systems:  Noncontributory except as per HPI    Physical Exam:  BP (!) 164/103   Pulse 82   Temp 36.5 °C (97.7 °F) (Temporal)   Resp 18   Ht 1.829 m (6')   Wt 119 kg (262 lb 5.6 oz)   SpO2 94%     Constitutional: Alert, oriented, no acute distress  HEENT:  Normocephalic and atraumatic, EOMI  Neck:   Supple, no JVD,   Cardiovascular: Regular rate and rhythm,   Pulmonary:  Good air entry bilaterally,    Abdominal:  Soft, non-tender, non-distended     Aortic impulse not widened  Musculoskeletal: No edema, no tenderness  Neurological:  CN II-XII grossly intact, no focal deficits  Skin:   Skin is warm and dry. No rash noted.  Psychiatric:  Normal mood and affect.    Labs:                    Assessment/Plan:  This is a 55 y.o. male here today for elective surgery: laparoscopic right inguinal hernia repair    I explained the details of the operation, alternatives, and potential risks, including but not limited to bleeding, infection, and risks of anesthesia.  All questions were answered. Patient understands and agrees to proceed.      Juan Manuel Cade MD  [facility] (General and Vascular Surgery)  Cell: [phone] (text or call is fine, if you don't reach me please try my office)  Office: [phone]    6/23/2020    3:23  PM  ___________________________________________________________________  Patient:Tej Ricks   MRN:[id number]   CSN:[id number]

## 2020-06-24 NOTE — ANESTHESIA QCDR
2019 [facility] Clinical Data Registry (for Quality Improvement)     Postoperative nausea/vomiting risk protocol (Adult = 18 yrs and Pediatric 3-17 yrs)- (430 and 463)  General inhalation anesthetic (NOT TIVA) with PONV risk factors: No  Provision of anti-emetic therapy with at least 2 different classes of agents: N/A  Patient DID NOT receive anti-emetic therapy and reason is documented in Medical Record: N/A    Multimodal Pain Management- (477)  Non-emergent surgery AND patient age >= 18: Yes  Use of Multimodal Pain Management, two or more drugs and/or interventions, NOT including systemic opioids: Yes  Exception: Documented allergy to multiple classes of analgesics: N/A    Smoking Abstinence (404)  Patient is current smoker (cigarette, pipe, e-cig, marijuanna): No  Elective Surgery:   Abstinence instructions provided prior to day of surgery:   Patient abstained from smoking on day of surgery:     Pre-Op Beta-Blocker in Isolated CABG (44)  Isolated CABG AND patient age >= 18: No  Beta-blocker admin within 24 hours of surgical incision:   Exception:of medical reason(s) for not administering beta blocker within 24 hours prior to surgical incision (e.g., not  indicated,other medical reason):     PACU assessment of acute postoperative pain prior to Anesthesia Care End- Applies to Patients Age = 18- (ABG7)  Initial PACU pain score is which of the following: < 7/10  Patient unable to report pain score: N/A    Post-anesthetic transfer of care checklist/protocol to PACU/ICU- (426 and 427)  Upon conclusion of case, patient transferred to which of the following locations: PACU/Non-ICU  Use of transfer checklist/protocol: Yes  Exclusion: Service Performed in Patient Hospital Room (and thus did not require transfer): N/A  Unplanned admission to ICU related to anesthesia service up through end of PACU care- (MD51)  Unplanned admission to ICU (not initially anticipated at anesthesia start time): No

## 2020-06-24 NOTE — ANESTHESIA TIME REPORT
Anesthesia Start and Stop Event Times     Date Time Event    6/23/2020 1539 Ready for Procedure     1550 Anesthesia Start     1725 Anesthesia Stop        Responsible Staff  06/23/20    Name Role Begin End    Orlando Tarango M.D. Anesth 1550 1725        Preop Diagnosis (Free Text):  Pre-op Diagnosis     RIGHT INGUINAL HENRIA        Preop Diagnosis (Codes):    Post op Diagnosis  Right inguinal hernia      Premium Reason  A. 3PM - 7AM    Comments:

## 2020-06-24 NOTE — OR NURSING
Pt's VSS; denies N/V; states pain is 6/10, oxycodone given to pt.  Dressing CDI, unchanged from initial assessment. D/c orders received. IV dc'd. Pt changed into clothing without assistance. Steady gait, tolerating PO. Discharge instructions given as well as pain management handout; pt and family verbalized understanding and questions answered. Patient states ready to d/c home. Rx for Norco, Zofran and Colace given to pt. Pt dc'd in w/c with CNA in stable condition.

## 2020-06-24 NOTE — OP REPORT
Operative Report     Date:    6/23/2020    Pre-operative Diagnosis:  Reducible right inguinal hernia     Post-operative Diagnosis: Reducible right inguinal hernia    Procedure:   Laparoscopic Right Inguinal Hernia Repair with Mesh.      Surgeon:   Juan Manuel Cade M.D.      Assistant:    Du CAMPBELL        Anesthesia:   General plus local 0.5% Marcaine with epinephrine      Bilateral TAP block for postoperative pain control    Blood Loss:   Minimal    Specimen:   None    Findings:   small direct hernia, moderate indirect hernia      Laparoscopic Progrip mesh for the repair    Wound classification: Clean    Disposition:   PACU in stable condition  ---------------------------------------------------------    History:  55 y.o. male evaluated in the clinic and found to have a reducible right inguinal hernia that was causing him frequent pain.  I had an extensive conversation with the patient regarding the recommendation for surgery.  I explained the details of the operation, alternatives, and potential risks, including but not limited to bleeding, infection, injury to vessels or nerves, injury to organs or intestines, and risks of anesthesia.  All questions were answered. They understand and agree to proceed.  Informed consent was obtained.    PROCEDURE:  The patient voluntarily voided their urinary bladder just prior to being brought back to the OR.  The patient was taken back to the operating room and placed in supine position.  General endotracheal anesthesia was administered and the patient was intubated. Intravenous antibiotics were administered by the anesthesiologist in correct time interval. Sequential compression devices were placed. All bony prominences were protected.  The abdomen was prepped and draped in a sterile fashion.  A time-out was performed and the patient and procedure were both verified.      Marcaine 0.5% with epinephrine was used to infiltrate all port sites. A 2cm transverse incision was  made below the umbilicus.  The subcutaneous tissues were spread bluntly to expose the fascia.  The anterior fascia was open sharply along the entire length of the incision to expose the right rectus muscular bundle.  This muscular bundle was swept laterally to expose the posterior fascia.  A 10mm dissecting balloon was then guided through the incision, along the pre-peritoneal space, and down to the pubic symphysis.  The balloon was then hand-pump inflated under direct visualization.  The balloon was deflated and removed.  A 12mm Reyes port was placed through the incision, the balloon was inflated, and the collar was cinched down to the skin level to secure the port.  Gas was applied to the port and insufflation was achieved.  A 10mm laparoscope was placed through the port.  There was no evidence of vascular injury or injury to the peritoneum.  Dissection by the dissecting balloon was adequate.    Two 5mm ports were placed in the lower midline between the umbilicus and the pubic symphysis under direct visualization.  Dissection was begun on the right side.  The epigastric vascular bundle was identified in its usual location.  The loose areolar tissue was swept down laterally to free the peritoneum.  Dissection continued medially until the cord structures were identified.  The cord was carefully dissected free from surrounding attachments.  The vas deferens was identified and protected.  The femoral, direct, and indirect spaces were all examined and the peritoneum was swept down posteriorly.  The area of dissection was noted to be hemostatic. Pre-peritoneal fat was cleared off the pubic symphysis and Galdino's ligament.    At this point a laparoscopic progrip mesh was introduced into the pre-peritoneal space through the Reyes port.  It was laid out flat and care was taken to cover the femoral, direct, and indirect spaces entirely.  The mesh overlapped the midline by 1cm.    The gas was released slowly and the mesh  appeared to lay in the pre-peritoneal space without wrinkles or folds.  The Reyes port was removed.  The nora-umbilical port site fascia was closed with an 0 vicryl suture.  The fascia was noted to be tight and well approximated.  All remaining ports were then removed.  All skin incisions were closed with interrupted 4-0 Vicryl subcuticular sutures and dressed with skin glue.     The patient tolerated the procedure well and there were no immediate apparent complications. All sponge, sharps, and instrument counts were correct on 2 separate occasions. The patient was awakened, extubated, and transferred to the PACU in satisfactory condition.     Juan Manuel Cade MD  General and Vascular Surgery  [facility]  Cell: [phone]

## 2020-06-24 NOTE — OR NURSING
Arrived to Phase II after report from [facility].    VSS, denies nausea, reports no pain at this time. Ambulated from gurney to chair with standby assist.    Surgical site CDI. No drainage.     Alarms on and set to appropriate parameters. Call light within reach, rounding in place. Pt. Tolerating PO. Using IS, belonging given to pt.

## 2020-06-24 NOTE — ANESTHESIA POSTPROCEDURE EVALUATION
Patient: Tej Ricks    Procedure Summary     Date:  06/23/20 Room / Location:  [facility] 04 / SURGERY [facility]    Anesthesia Start:  1550 Anesthesia Stop:  1725    Procedure:  LAPAROSCOPIC REPAIR RIGHT INGUINAL HERNIA (Right Groin) Diagnosis:  (RIGHT INGUINAL HENRIA)    Surgeon:  Juan Manuel Cade M.D. Responsible Provider:  Orlando Taragno M.D.    Anesthesia Type:  general, peripheral nerve block ASA Status:  2          Final Anesthesia Type: general, peripheral nerve block  Last vitals  BP   Blood Pressure: 127/83    Temp   36.1 °C (97 °F)    Pulse   Pulse: 76   Resp   13    SpO2   91 %      Anesthesia Post Evaluation    Patient location during evaluation: PACU  Patient participation: complete - patient participated  Level of consciousness: awake and alert  Pain score: 0    Airway patency: patent  Anesthetic complications: no  Cardiovascular status: hemodynamically stable  Respiratory status: acceptable  Hydration status: euvolemic    PONV: none           Nurse Pain Score: 0 (NPRS)

## 2020-06-24 NOTE — DISCHARGE INSTRUCTIONS
ACTIVITY: Rest and take it easy for the first 24 hours.  A responsible adult is recommended to remain with you during that time.  It is normal to feel sleepy.  We encourage you to not do anything that requires balance, judgment or coordination.    MILD FLU-LIKE SYMPTOMS ARE NORMAL. YOU MAY EXPERIENCE GENERALIZED MUSCLE ACHES, THROAT IRRITATION, HEADACHE AND/OR SOME NAUSEA.    FOR 24 HOURS DO NOT:  Drive, operate machinery or run household appliances.  Drink beer or alcoholic beverages.   Make important decisions or sign legal documents.                     Inguinal Hernia Repair Discharge Instructions:     1. DIET: You may resume your normal preoperative diet.     2. ACTIVITIES: You may resume routine activities. Heavy lifting (over 15 pounds) and strenuous activities should be avoided for one month after surgery. Routine activities of daily living such as walking, going up and down stairs are acceptable.     3. DRIVING: You may drive whenever you are no longer taking narcotic pain medications and are able to perform the activities needed to drive safely, i.e. turning, bending, twisting, etc.     4. BATHING: You have waterproof glue covering your incisions, so you can shower anytime but not baths for 1 week after surgery. The glue will peel off after a few days.       5. BOWEL FUNCTION: Constipation is common after surgery. If you feel this is occurring, take a laxative (Milk of Magnesia, Ex-Lax, Senokot, Miralax, Magnesium Citrate) until the problem has resolved.     6. PAIN MEDICATION: You will be given a prescription for pain medication. Please take these as directed. You may also use Advil/Motrin/Ibuprofen to help reduce pain in addition to your pain medication. It is important to remember not to take medications on an empty stomach as this may cause nausea. Do not consume alcohol while taking pain medication.  You can put ice packs on the groin(s) if it helps for additional pain relief.  Ice should be applied  for 20 minutes at a time, with a 20 minute break before reapplying.     7. WHAT TO EXPECT: You may develop swelling and bruising at the base of your genitalia and within the scrotum (males) or labias (females). This is common and may take several weeks to resolve.       8. CALL IF YOU HAVE: (1) Fevers to more than 101.5F, (2) Unusual chest or leg pain, (3) Drainage or fluid from incision that may be foul smelling, increased tenderness or soreness at the wound or the wound edges are no longer together, redness or swelling at the incision site.  (4) Profound swelling at the incision site or in the genitals.     9. FOLLOW-UP: Call and schedule a follow up appointment in 2 weeks.  If you have any additional questions at all, please do not hesitate to call the office and speak to my medical assistant, myself, or the physician on call.     Juan Manuel Cade MD  [facility]  [address] [place] [address], [address], [place], [place] [id number] [phone]                   DIET: To avoid nausea, slowly advance diet as tolerated, avoiding spicy or greasy foods for the first day.  Add more substantial food to your diet according to your physician's instructions.  Babies can be fed formula or breast milk as soon as they are hungry.  INCREASE FLUIDS AND FIBER TO AVOID CONSTIPATION.      FOLLOW-UP APPOINTMENT:  A follow-up appointment should be arranged with your doctor in 2 weeks; call to schedule.    You should CALL YOUR PHYSICIAN if you develop:  Fever greater than 101 degrees F.  Pain not relieved by medication, or persistent nausea or vomiting.  Excessive bleeding (blood soaking through dressing) or unexpected drainage from the wound.  Extreme redness or swelling around the incision site, drainage of pus or foul smelling drainage.  Inability to urinate or empty your bladder within 8 hours.  Problems with breathing or chest pain.    You should call 911 if you develop problems with breathing or chest pain.  If you are unable to contact  your doctor or surgical center, you should go to the nearest emergency room or urgent care center.  Physician's telephone #: Dr Cade [phone]    If any questions arise, call your doctor.  If your doctor is not available, please feel free to call the Surgical Center at {Surgical Dept Numbers:[id number]}.  The Center is open Monday through Friday from 7AM to 7PM.  You can also call the HEALTH HOTLINE open 24 hours/day, 7 days/week and speak to a nurse at [phone], or toll free at [phone].    A registered nurse may call you a few days after your surgery to see how you are doing after your procedure.    MEDICATIONS: Resume taking daily medication.  Take prescribed pain medication with food.  If no medication is prescribed, you may take non-aspirin pain medication if needed.  PAIN MEDICATION CAN BE VERY CONSTIPATING.  Take a stool softener or laxative such as senokot, pericolace, or milk of magnesia if needed.    Prescription given for Norco .     If your physician has prescribed pain medication that includes Acetaminophen (Tylenol), do not take additional Acetaminophen (Tylenol) while taking the prescribed medication.    Depression / Suicide Risk    As you are discharged from this [facility] facility, it is important to learn how to keep safe from harming yourself.    Recognize the warning signs:  · Abrupt changes in personality, positive or negative- including increase in energy   · Giving away possessions  · Change in eating patterns- significant weight changes-  positive or negative  · Change in sleeping patterns- unable to sleep or sleeping all the time   · Unwillingness or inability to communicate  · Depression  · Unusual sadness, discouragement and loneliness  · Talk of wanting to die  · Neglect of personal appearance   · Rebelliousness- reckless behavior  · Withdrawal from people/activities they love  · Confusion- inability to concentrate     If you or a loved one observes any of these behaviors  or has concerns about self-harm, here's what you can do:  · Talk about it- your feelings and reasons for harming yourself  · Remove any means that you might use to hurt yourself (examples: pills, rope, extension cords, firearm)  · Get professional help from the community (Mental Health, Substance Abuse, psychological counseling)  · Do not be alone:Call your Safe Contact- someone whom you trust who will be there for you.  · Call your local CRISIS HOTLINE 474-7794 or 204-990-4479  · Call your local Children's Mobile Crisis Response Team [place] (873) 250-0130 or www.Cloudmeter  · Call the toll free National Suicide Prevention Hotlines   · National Suicide Prevention Lifeline 279-357-GPVU (6313)  · National Hope Line Network 800-SUICIDE (232-6334)

## 2020-09-10 RX ORDER — TRAMADOL HYDROCHLORIDE 50 MG/1
50 TABLET ORAL PRN
COMMUNITY

## 2020-09-12 ENCOUNTER — PRE-ADMISSION TESTING (OUTPATIENT)
Dept: ADMISSIONS | Facility: MEDICAL CENTER | Age: 55
End: 2020-09-12
Attending: SURGERY
Payer: COMMERCIAL

## 2020-09-12 DIAGNOSIS — Z01.812 PRE-PROCEDURAL LABORATORY EXAMINATION: ICD-10-CM

## 2020-09-12 LAB
COVID ORDER STATUS COVID19: NORMAL
SARS-COV-2 RNA RESP QL NAA+PROBE: NOTDETECTED
SPECIMEN SOURCE: NORMAL

## 2020-09-12 PROCEDURE — C9803 HOPD COVID-19 SPEC COLLECT: HCPCS

## 2020-09-12 PROCEDURE — U0003 INFECTIOUS AGENT DETECTION BY NUCLEIC ACID (DNA OR RNA); SEVERE ACUTE RESPIRATORY SYNDROME CORONAVIRUS 2 (SARS-COV-2) (CORONAVIRUS DISEASE [COVID-19]), AMPLIFIED PROBE TECHNIQUE, MAKING USE OF HIGH THROUGHPUT TECHNOLOGIES AS DESCRIBED BY CMS-2020-01-R: HCPCS

## 2020-09-15 ENCOUNTER — HOSPITAL ENCOUNTER (OUTPATIENT)
Facility: MEDICAL CENTER | Age: 55
End: 2020-09-15
Attending: SURGERY | Admitting: SURGERY
Payer: COMMERCIAL

## 2020-09-15 ENCOUNTER — ANESTHESIA EVENT (OUTPATIENT)
Dept: SURGERY | Facility: MEDICAL CENTER | Age: 55
End: 2020-09-15
Payer: COMMERCIAL

## 2020-09-15 ENCOUNTER — ANESTHESIA (OUTPATIENT)
Dept: SURGERY | Facility: MEDICAL CENTER | Age: 55
End: 2020-09-15
Payer: COMMERCIAL

## 2020-09-15 VITALS
OXYGEN SATURATION: 94 % | DIASTOLIC BLOOD PRESSURE: 73 MMHG | SYSTOLIC BLOOD PRESSURE: 116 MMHG | HEART RATE: 65 BPM | HEIGHT: 72 IN | TEMPERATURE: 96.9 F | BODY MASS INDEX: 36.16 KG/M2 | RESPIRATION RATE: 16 BRPM | WEIGHT: 266.98 LBS

## 2020-09-15 DIAGNOSIS — Z01.812 PRE-PROCEDURAL LABORATORY EXAMINATION: ICD-10-CM

## 2020-09-15 PROBLEM — G57.91 ILIOINGUINAL NEURALGIA OF RIGHT SIDE: Status: ACTIVE | Noted: 2020-09-15

## 2020-09-15 PROBLEM — G57.91 ILIOINGUINAL NEURALGIA OF RIGHT SIDE: Status: RESOLVED | Noted: 2020-09-15 | Resolved: 2020-09-15

## 2020-09-15 PROBLEM — E66.9 OBESITY (BMI 30-39.9): Status: ACTIVE | Noted: 2020-09-15

## 2020-09-15 LAB — PATHOLOGY CONSULT NOTE: NORMAL

## 2020-09-15 PROCEDURE — 88302 TISSUE EXAM BY PATHOLOGIST: CPT

## 2020-09-15 PROCEDURE — A9270 NON-COVERED ITEM OR SERVICE: HCPCS | Performed by: SURGERY

## 2020-09-15 PROCEDURE — 500380 HCHG DRAIN, PENROSE 1/4X12: Performed by: SURGERY

## 2020-09-15 PROCEDURE — 700102 HCHG RX REV CODE 250 W/ 637 OVERRIDE(OP): Performed by: ANESTHESIOLOGY

## 2020-09-15 PROCEDURE — 160009 HCHG ANES TIME/MIN: Performed by: SURGERY

## 2020-09-15 PROCEDURE — 700105 HCHG RX REV CODE 258: Performed by: ANESTHESIOLOGY

## 2020-09-15 PROCEDURE — 700105 HCHG RX REV CODE 258: Performed by: SURGERY

## 2020-09-15 PROCEDURE — 160035 HCHG PACU - 1ST 60 MINS PHASE I: Performed by: SURGERY

## 2020-09-15 PROCEDURE — 160048 HCHG OR STATISTICAL LEVEL 1-5: Performed by: SURGERY

## 2020-09-15 PROCEDURE — 700101 HCHG RX REV CODE 250: Performed by: SURGERY

## 2020-09-15 PROCEDURE — 700101 HCHG RX REV CODE 250: Performed by: ANESTHESIOLOGY

## 2020-09-15 PROCEDURE — 501838 HCHG SUTURE GENERAL: Performed by: SURGERY

## 2020-09-15 PROCEDURE — 160039 HCHG SURGERY MINUTES - EA ADDL 1 MIN LEVEL 3: Performed by: SURGERY

## 2020-09-15 PROCEDURE — 160025 RECOVERY II MINUTES (STATS): Performed by: SURGERY

## 2020-09-15 PROCEDURE — 700102 HCHG RX REV CODE 250 W/ 637 OVERRIDE(OP): Performed by: SURGERY

## 2020-09-15 PROCEDURE — 700111 HCHG RX REV CODE 636 W/ 250 OVERRIDE (IP): Performed by: ANESTHESIOLOGY

## 2020-09-15 PROCEDURE — 160002 HCHG RECOVERY MINUTES (STAT): Performed by: SURGERY

## 2020-09-15 PROCEDURE — A9270 NON-COVERED ITEM OR SERVICE: HCPCS | Performed by: ANESTHESIOLOGY

## 2020-09-15 PROCEDURE — 501445 HCHG STAPLER, SKIN DISP: Performed by: SURGERY

## 2020-09-15 PROCEDURE — 160036 HCHG PACU - EA ADDL 30 MINS PHASE I: Performed by: SURGERY

## 2020-09-15 PROCEDURE — 160046 HCHG PACU - 1ST 60 MINS PHASE II: Performed by: SURGERY

## 2020-09-15 PROCEDURE — 160028 HCHG SURGERY MINUTES - 1ST 30 MINS LEVEL 3: Performed by: SURGERY

## 2020-09-15 RX ORDER — PHENYLEPHRINE HYDROCHLORIDE 10 MG/ML
INJECTION, SOLUTION INTRAMUSCULAR; INTRAVENOUS; SUBCUTANEOUS PRN
Status: DISCONTINUED | OUTPATIENT
Start: 2020-09-15 | End: 2020-09-15 | Stop reason: SURG

## 2020-09-15 RX ORDER — OXYCODONE HCL 5 MG/5 ML
5 SOLUTION, ORAL ORAL
Status: COMPLETED | OUTPATIENT
Start: 2020-09-15 | End: 2020-09-15

## 2020-09-15 RX ORDER — ACETAMINOPHEN 500 MG
1000 TABLET ORAL ONCE
Status: COMPLETED | OUTPATIENT
Start: 2020-09-15 | End: 2020-09-15

## 2020-09-15 RX ORDER — ONDANSETRON 2 MG/ML
4 INJECTION INTRAMUSCULAR; INTRAVENOUS
Status: DISCONTINUED | OUTPATIENT
Start: 2020-09-15 | End: 2020-09-15 | Stop reason: HOSPADM

## 2020-09-15 RX ORDER — SODIUM CHLORIDE, SODIUM LACTATE, POTASSIUM CHLORIDE, CALCIUM CHLORIDE 600; 310; 30; 20 MG/100ML; MG/100ML; MG/100ML; MG/100ML
INJECTION, SOLUTION INTRAVENOUS CONTINUOUS
Status: DISCONTINUED | OUTPATIENT
Start: 2020-09-15 | End: 2020-09-15 | Stop reason: HOSPADM

## 2020-09-15 RX ORDER — HALOPERIDOL 5 MG/ML
1 INJECTION INTRAMUSCULAR
Status: DISCONTINUED | OUTPATIENT
Start: 2020-09-15 | End: 2020-09-15 | Stop reason: HOSPADM

## 2020-09-15 RX ORDER — BUPIVACAINE HYDROCHLORIDE AND EPINEPHRINE 5; 5 MG/ML; UG/ML
INJECTION, SOLUTION EPIDURAL; INTRACAUDAL; PERINEURAL
Status: DISCONTINUED | OUTPATIENT
Start: 2020-09-15 | End: 2020-09-15 | Stop reason: HOSPADM

## 2020-09-15 RX ORDER — DEXAMETHASONE SODIUM PHOSPHATE 4 MG/ML
INJECTION, SOLUTION INTRA-ARTICULAR; INTRALESIONAL; INTRAMUSCULAR; INTRAVENOUS; SOFT TISSUE PRN
Status: DISCONTINUED | OUTPATIENT
Start: 2020-09-15 | End: 2020-09-15 | Stop reason: SURG

## 2020-09-15 RX ORDER — GABAPENTIN 300 MG/1
300 CAPSULE ORAL ONCE
Status: COMPLETED | OUTPATIENT
Start: 2020-09-15 | End: 2020-09-15

## 2020-09-15 RX ORDER — KETOROLAC TROMETHAMINE 30 MG/ML
INJECTION, SOLUTION INTRAMUSCULAR; INTRAVENOUS PRN
Status: DISCONTINUED | OUTPATIENT
Start: 2020-09-15 | End: 2020-09-15 | Stop reason: SURG

## 2020-09-15 RX ORDER — ONDANSETRON 2 MG/ML
INJECTION INTRAMUSCULAR; INTRAVENOUS PRN
Status: DISCONTINUED | OUTPATIENT
Start: 2020-09-15 | End: 2020-09-15 | Stop reason: SURG

## 2020-09-15 RX ORDER — MEPERIDINE HYDROCHLORIDE 25 MG/ML
12.5 INJECTION INTRAMUSCULAR; INTRAVENOUS; SUBCUTANEOUS
Status: DISCONTINUED | OUTPATIENT
Start: 2020-09-15 | End: 2020-09-15 | Stop reason: HOSPADM

## 2020-09-15 RX ORDER — LIDOCAINE HYDROCHLORIDE 20 MG/ML
INJECTION, SOLUTION EPIDURAL; INFILTRATION; INTRACAUDAL; PERINEURAL PRN
Status: DISCONTINUED | OUTPATIENT
Start: 2020-09-15 | End: 2020-09-15 | Stop reason: SURG

## 2020-09-15 RX ORDER — CEFAZOLIN SODIUM 1 G/3ML
INJECTION, POWDER, FOR SOLUTION INTRAMUSCULAR; INTRAVENOUS PRN
Status: DISCONTINUED | OUTPATIENT
Start: 2020-09-15 | End: 2020-09-15 | Stop reason: SURG

## 2020-09-15 RX ORDER — OXYCODONE HCL 5 MG/5 ML
10 SOLUTION, ORAL ORAL
Status: COMPLETED | OUTPATIENT
Start: 2020-09-15 | End: 2020-09-15

## 2020-09-15 RX ADMIN — LIDOCAINE HYDROCHLORIDE 5 ML: 20 INJECTION, SOLUTION EPIDURAL; INFILTRATION; INTRACAUDAL at 13:24

## 2020-09-15 RX ADMIN — SODIUM CHLORIDE, POTASSIUM CHLORIDE, SODIUM LACTATE AND CALCIUM CHLORIDE: 600; 310; 30; 20 INJECTION, SOLUTION INTRAVENOUS at 12:20

## 2020-09-15 RX ADMIN — KETOROLAC TROMETHAMINE 30 MG: 30 INJECTION, SOLUTION INTRAMUSCULAR at 13:52

## 2020-09-15 RX ADMIN — OXYCODONE HYDROCHLORIDE 10 MG: 5 SOLUTION ORAL at 14:30

## 2020-09-15 RX ADMIN — PHENYLEPHRINE HYDROCHLORIDE 100 MCG: 10 INJECTION INTRAVENOUS at 14:04

## 2020-09-15 RX ADMIN — FENTANYL CITRATE 50 MCG: 50 INJECTION INTRAMUSCULAR; INTRAVENOUS at 13:24

## 2020-09-15 RX ADMIN — FENTANYL CITRATE 50 MCG: 50 INJECTION INTRAMUSCULAR; INTRAVENOUS at 14:55

## 2020-09-15 RX ADMIN — GABAPENTIN 300 MG: 300 CAPSULE ORAL at 12:19

## 2020-09-15 RX ADMIN — LIDOCAINE HYDROCHLORIDE 0.5 ML: 10 INJECTION, SOLUTION EPIDURAL; INFILTRATION; INTRACAUDAL at 12:20

## 2020-09-15 RX ADMIN — FENTANYL CITRATE 25 MCG: 50 INJECTION INTRAMUSCULAR; INTRAVENOUS at 13:50

## 2020-09-15 RX ADMIN — CEFAZOLIN 2 G: 330 INJECTION, POWDER, FOR SOLUTION INTRAMUSCULAR; INTRAVENOUS at 13:24

## 2020-09-15 RX ADMIN — SODIUM CHLORIDE, POTASSIUM CHLORIDE, SODIUM LACTATE AND CALCIUM CHLORIDE 300 ML: 600; 310; 30; 20 INJECTION, SOLUTION INTRAVENOUS at 14:20

## 2020-09-15 RX ADMIN — ONDANSETRON 4 MG: 2 INJECTION INTRAMUSCULAR; INTRAVENOUS at 13:52

## 2020-09-15 RX ADMIN — DEXAMETHASONE SODIUM PHOSPHATE 8 MG: 4 INJECTION, SOLUTION INTRA-ARTICULAR; INTRALESIONAL; INTRAMUSCULAR; INTRAVENOUS; SOFT TISSUE at 13:24

## 2020-09-15 RX ADMIN — FENTANYL CITRATE 25 MCG: 50 INJECTION INTRAMUSCULAR; INTRAVENOUS at 13:30

## 2020-09-15 RX ADMIN — POVIDONE IODINE 15 ML: 100 SOLUTION TOPICAL at 12:20

## 2020-09-15 RX ADMIN — MIDAZOLAM 2 MG: 1 INJECTION INTRAMUSCULAR; INTRAVENOUS at 13:20

## 2020-09-15 RX ADMIN — PROPOFOL 240 MG: 10 INJECTION, EMULSION INTRAVENOUS at 13:24

## 2020-09-15 RX ADMIN — FENTANYL CITRATE 25 MCG: 50 INJECTION INTRAMUSCULAR; INTRAVENOUS at 13:43

## 2020-09-15 RX ADMIN — FENTANYL CITRATE 50 MCG: 50 INJECTION INTRAMUSCULAR; INTRAVENOUS at 13:57

## 2020-09-15 RX ADMIN — FENTANYL CITRATE 25 MCG: 50 INJECTION INTRAMUSCULAR; INTRAVENOUS at 13:33

## 2020-09-15 RX ADMIN — ACETAMINOPHEN 1000 MG: 500 TABLET ORAL at 12:19

## 2020-09-15 SDOH — HEALTH STABILITY: MENTAL HEALTH: HOW OFTEN DO YOU HAVE A DRINK CONTAINING ALCOHOL?: NEVER

## 2020-09-15 SDOH — HEALTH STABILITY: MENTAL HEALTH: HOW OFTEN DO YOU HAVE 6 OR MORE DRINKS ON ONE OCCASION?: NEVER

## 2020-09-15 ASSESSMENT — PAIN DESCRIPTION - PAIN TYPE
TYPE: SURGICAL PAIN

## 2020-09-15 ASSESSMENT — PAIN SCALES - GENERAL: PAIN_LEVEL: 2

## 2020-09-15 NOTE — OR NURSING
1413: received to PACU via gurney. Sleeping. Respirations even and unlabored. Abdomen soft. Incision to right inguinal area with Dermabond open to air. Iced.  1420: patient awaken. Denies pain or nausea.  1430: c/o right groin pain. Medicated. See MAR. Water p.o. given. Tolerated well.  1455: pain scale 8-9/10 to right groin area. Medicated. See MAR.  1500: pain scale 5/10 and tolerable.  1510: instructed patient on how to use the Incentive Spirometer. Able to get to [phone]. Encouraged to cough and deep breath.  1520: report called to Alex BEY.  1532: [facility] via [facility] to PACU 2. Stable. Patient wearing face mask.

## 2020-09-15 NOTE — OR NURSING
@4829 Pt arrived to Phase 2. Pt has complaints of 6/10 tolerable pain. Pt has no complaints of nausea. Pt mobilized from gurney to chair. Incision site is clean dry and intact. Discharge instructions discussed with patient at bedside. Pt verbalizes understanding.    @8766 Pts daughter in law notified of patients arrival to phase 2. Pt states he feels ready to go home. PIV removed. PT assisted with getting dressed.     @5297 Pt discharged to home with all belongings.

## 2020-09-15 NOTE — ANESTHESIA TIME REPORT
Anesthesia Start and Stop Event Times     Date Time Event    9/15/2020 1242 Ready for Procedure     1320 Anesthesia Start     1414 Anesthesia Stop        Responsible Staff  09/15/20    Name Role Begin End    Bonnie Rodríguez M.D. Anesth 1320 1414        Preop Diagnosis (Free Text):  Pre-op Diagnosis     INGUINAL NEURALGIA        Preop Diagnosis (Codes):    Post op Diagnosis  Neuralgia of right inguinal region      Premium Reason  Non-Premium    Comments:

## 2020-09-15 NOTE — ANESTHESIA QCDR
2019 [facility] Clinical Data Registry (for Quality Improvement)     Postoperative nausea/vomiting risk protocol (Adult = 18 yrs and Pediatric 3-17 yrs)- (430 and 463)  General inhalation anesthetic (NOT TIVA) with PONV risk factors: No  Provision of anti-emetic therapy with at least 2 different classes of agents: N/A  Patient DID NOT receive anti-emetic therapy and reason is documented in Medical Record: N/A    Multimodal Pain Management- (477)  Non-emergent surgery AND patient age >= 18: Yes  Use of Multimodal Pain Management, two or more drugs and/or interventions, NOT including systemic opioids: Yes  Exception: Documented allergy to multiple classes of analgesics: N/A    Smoking Abstinence (404)  Patient is current smoker (cigarette, pipe, e-cig, marijuanna): No  Elective Surgery:   Abstinence instructions provided prior to day of surgery:   Patient abstained from smoking on day of surgery:     Pre-Op Beta-Blocker in Isolated CABG (44)  Isolated CABG AND patient age >= 18: No  Beta-blocker admin within 24 hours of surgical incision:   Exception:of medical reason(s) for not administering beta blocker within 24 hours prior to surgical incision (e.g., not  indicated,other medical reason):     PACU assessment of acute postoperative pain prior to Anesthesia Care End- Applies to Patients Age = 18- (ABG7)  Initial PACU pain score is which of the following: < 7/10  Patient unable to report pain score: N/A    Post-anesthetic transfer of care checklist/protocol to PACU/ICU- (426 and 427)  Upon conclusion of case, patient transferred to which of the following locations: PACU/Non-ICU  Use of transfer checklist/protocol: Yes  Exclusion: Service Performed in Patient Hospital Room (and thus did not require transfer): N/A  Unplanned admission to ICU related to anesthesia service up through end of PACU care- (MD51)  Unplanned admission to ICU (not initially anticipated at anesthesia start time): No

## 2020-09-15 NOTE — ANESTHESIA POSTPROCEDURE EVALUATION
Patient: Tej Ricks    Procedure Summary     Date: 09/15/20 Room / Location: [facility] 06 / SURGERY [facility]    Anesthesia Start: 1320 Anesthesia Stop: 1414    Procedure: REPAIR, HERNIA, INGUINAL- FOR LIGATION OF ILIOINGUINAL NERVE (Right Groin) Diagnosis: (INGUINAL NEURALGIA)    Surgeon: Juan Manuel Cade M.D. Responsible Provider: Bonnie Rodríguez M.D.    Anesthesia Type: general ASA Status: 2          Final Anesthesia Type: general  Last vitals  BP   Blood Pressure: 123/69    Temp   36.1 °C (97 °F)    Pulse   Pulse: 72   Resp   17    SpO2   95 %      Anesthesia Post Evaluation    Patient location during evaluation: PACU  Patient participation: complete - patient participated  Level of consciousness: awake  Pain score: 2    Airway patency: patent  Anesthetic complications: no  Cardiovascular status: adequate  Respiratory status: acceptable  Hydration status: acceptable    PONV: none           Nurse Pain Score: 2 (NPRS)

## 2020-09-15 NOTE — ANESTHESIA PREPROCEDURE EVALUATION
56 yo w/right inguinal neuralgia    Relevant Problems   ANESTHESIA (within normal limits)      Other   (+) Obesity (BMI 30-39.9)       Physical Exam    Airway   Mallampati: II  TM distance: >3 FB  Neck ROM: full       Cardiovascular   Rhythm: regular  Rate: normal  (-) murmur     Dental - normal exam           Pulmonary   Breath sounds clear to auscultation     Abdominal    Neurological - normal exam                 Anesthesia Plan    ASA 2       Plan - general       Airway plan will be LMA        Induction: intravenous    Postoperative Plan: Postoperative administration of opioids is intended.    Pertinent diagnostic labs and testing reviewed    Informed Consent:    Anesthetic plan and risks discussed with patient.

## 2020-09-15 NOTE — ANESTHESIA PROCEDURE NOTES
Airway    Date/Time: 9/15/2020 1:25 PM  Performed by: Bonnie Rodríguez M.D.  Authorized by: Bonnie Rodríguez M.D.     Location:  OR  Urgency:  Elective  Indications for Airway Management:  Anesthesia      Spontaneous Ventilation: absent    Sedation Level:  Deep  Preoxygenated: Yes    Mask Difficulty Assessment:  0 - not attempted  Final Airway Type:  Supraglottic airway  Final Supraglottic Airway:  Standard LMA    SGA Size:  5  Number of Attempts at Approach:  1

## 2020-09-15 NOTE — DISCHARGE INSTRUCTIONS
ACTIVITY: Rest and take it easy for the first 24 hours.  A responsible adult is recommended to remain with you during that time.  It is normal to feel sleepy.  We encourage you to not do anything that requires balance, judgment or coordination.    MILD FLU-LIKE SYMPTOMS ARE NORMAL. YOU MAY EXPERIENCE GENERALIZED MUSCLE ACHES, THROAT IRRITATION, HEADACHE AND/OR SOME NAUSEA.    FOR 24 HOURS DO NOT:  Drive, operate machinery or run household appliances.  Drink beer or alcoholic beverages.   Make important decisions or sign legal documents.    DIET: To avoid nausea, slowly advance diet as tolerated, avoiding spicy or greasy foods for the first day.  Add more substantial food to your diet according to your physician's instructions.  Babies can be fed formula or breast milk as soon as they are hungry.  INCREASE FLUIDS AND FIBER TO AVOID CONSTIPATION.    SURGICAL DRESSING/BATHING & SPECIAL INSTRUCTIONS:     Keep incision site clean and dry.  You may shower starting tomorrow.  Ice pack to operative site.    FOLLOW-UP APPOINTMENT:  A follow-up appointment should be arranged with your doctor 1-2 weeks; call to schedule.    You should CALL YOUR PHYSICIAN if you develop:  Fever greater than 101 degrees F.  Pain not relieved by medication, or persistent nausea or vomiting.  Excessive bleeding (blood soaking through dressing) or unexpected drainage from the wound.  Extreme redness or swelling around the incision site, drainage of pus or foul smelling drainage.  Inability to urinate or empty your bladder within 8 hours.  Problems with breathing or chest pain.    You should call 911 if you develop problems with breathing or chest pain.  If you are unable to contact your doctor or surgical center, you should go to the nearest emergency room or urgent care center.  Physician's telephone #: [phone] Dr. Cade    If any questions arise, call your doctor.  If your doctor is not available, please feel free to call the Surgical  Center at [phone].  The Center is open Monday through Friday from 7AM to 7PM.  You can also call the HEALTH HOTLINE open 24 hours/day, 7 days/week and speak to a nurse at [phone], or toll free at [phone].    A registered nurse may call you a few days after your surgery to see how you are doing after your procedure.    MEDICATIONS: Resume taking daily medication.  Take prescribed pain medication with food.  If no medication is prescribed, you may take non-aspirin pain medication if needed.  PAIN MEDICATION CAN BE VERY CONSTIPATING.  Take a stool softener or laxative such as senokot, pericolace, or milk of magnesia if needed.    Prescription at home.  Last pain medication given at 2:30 PM ( Oxycodone 10 mg ).    If your physician has prescribed pain medication that includes Acetaminophen (Tylenol), do not take additional Acetaminophen (Tylenol) while taking the prescribed medication.    Depression / Suicide Risk    As you are discharged from this [facility] facility, it is important to learn how to keep safe from harming yourself.    Recognize the warning signs:  · Abrupt changes in personality, positive or negative- including increase in energy   · Giving away possessions  · Change in eating patterns- significant weight changes-  positive or negative  · Change in sleeping patterns- unable to sleep or sleeping all the time   · Unwillingness or inability to communicate  · Depression  · Unusual sadness, discouragement and loneliness  · Talk of wanting to die  · Neglect of personal appearance   · Rebelliousness- reckless behavior  · Withdrawal from people/activities they love  · Confusion- inability to concentrate     If you or a loved one observes any of these behaviors or has concerns about self-harm, here's what you can do:  · Talk about it- your feelings and reasons for harming yourself  · Remove any means that you might use to hurt yourself (examples: pills, rope, extension cords,  firearm)  · Get professional help from the community (Mental Health, Substance Abuse, psychological counseling)  · Do not be alone:Call your Safe Contact- someone whom you trust who will be there for you.  · Call your local CRISIS HOTLINE 588-5652 or 766-574-4563  · Call your local Children's Mobile Crisis Response Team [place] (175) 780-0828 or www.[place] Orders  · Call the toll free National Suicide Prevention Hotlines   · National Suicide Prevention Lifeline 831-577-CVFN (3972)  · National Hope Line Network 800-SUICIDE (717-6920)

## 2020-09-18 NOTE — OP REPORT
DATE OF SERVICE:  09/15/2020    PREOPERATIVE DIAGNOSIS:  Right ilioinguinal neuralgia.    POSTOPERATIVE DIAGNOSIS:  Right ilioinguinal neuralgia.    PROCEDURE:  Ligation of the right ilioinguinal nerve.    SURGEON:  Juan Manuel Cade MD    ASSISTANT:  None.    ANESTHESIA:  Local plus sedation.    BLOOD LOSS:  Minimal.    SPECIMENS:  A 2 cm segment of the right ilioinguinal nerve sent to pathology   for confirmation.    DISPOSITION:  Patient tolerated the procedure well.  He was sent to recovery   in stable condition.    PROCEDURE:  Following informed consent, patient was placed supine on the   operating table.  IV sedation was given to make the patient comfortable.  The   right groin was prepped and draped sterile.  Surgical timeout was called.    Patient received preoperative antibiotics.    Local anesthetic was infiltrated over the right groin and an oblique incision   was made just superior to the inguinal ligament.  We dissected down through   the subcutaneous tissue and we identified the external oblique fascia.  This   was opened sharply and we identified the ilioinguinal nerve.  We resected an   approximately 2 cm segment of the ilioinguinal nerve and we used   electrocautery to secure the ends of the nerve.  The specimen was sent to   pathology for confirmation.  The external oblique fascia was closed with a   running 2-0 Vicryl suture.  The skin incision was closed with multiple layers   of absorbable suture and skin glue.  Patient tolerated the procedure well.  He   was sent to recovery in stable condition.       ____________________________________     Juan Manuel Cade MD    [facility] / NTS    DD:  09/17/2020 21:12:27  DT:  09/17/2020 21:55:57    D#:  [id number]  Job#:  [id number]

## 2021-03-15 DIAGNOSIS — Z23 NEED FOR VACCINATION: ICD-10-CM

## (undated) DEVICE — SUTURE 4-0 30CM STRATAFIX SPIRAL PS-2 (12EA/BX)

## (undated) DEVICE — GOWN SURGICAL XX-LARGE - (28EA/CA) SIRUS NON REINFORCED

## (undated) DEVICE — BLADE SURGICAL CLIPPER - (50EA/CA)

## (undated) DEVICE — STAPLER SKIN DISP - (6/BX 10BX/CA) VISISTAT

## (undated) DEVICE — BAG RETRIEVAL 10ML (10EA/BX)

## (undated) DEVICE — CHLORAPREP 26 ML APPLICATOR - ORANGE TINT(25/CA)

## (undated) DEVICE — GLOVE BIOGEL SZ 8 SURGICAL PF LTX - (50PR/BX 4BX/CA)

## (undated) DEVICE — SET EXTENSION WITH 2 PORTS (48EA/CA) ***PART #2C8610 IS A SUBSTITUTE*****

## (undated) DEVICE — HEAD HOLDER JUNIOR/ADULT

## (undated) DEVICE — ELECTRODE 850 FOAM ADHESIVE - HYDROGEL RADIOTRNSPRNT (50/PK)

## (undated) DEVICE — GLOVE BIOGEL PI INDICATOR SZ 7.5 SURGICAL PF LF -(50/BX 4BX/CA)

## (undated) DEVICE — PROTECTOR ULNA NERVE - (36PR/CA)

## (undated) DEVICE — ELECTRODE DUAL RETURN W/ CORD - (50/PK)

## (undated) DEVICE — Device

## (undated) DEVICE — SET LEADWIRE 5 LEAD BEDSIDE DISPOSABLE ECG (1SET OF 5/EA)

## (undated) DEVICE — SUCTION INSTRUMENT YANKAUER BULBOUS TIP W/O VENT (50EA/CA)

## (undated) DEVICE — KIT ANESTHESIA W/CIRCUIT & 3/LT BAG W/FILTER (20EA/CA)

## (undated) DEVICE — SUTURE 0 VICRYL PLUS UR-6 - 27 INCH (36/BX)

## (undated) DEVICE — LACTATED RINGERS INJ 1000 ML - (14EA/CA 60CA/PF)

## (undated) DEVICE — PACK LAP CHOLE OR - (2EA/CA)

## (undated) DEVICE — SUTURE GENERAL

## (undated) DEVICE — SENSOR SPO2 NEO LNCS ADHESIVE (20/BX) SEE USER NOTES

## (undated) DEVICE — DRAIN PENROSE STERILE 1/4 X - 18 IN  (25EA/BX)

## (undated) DEVICE — SLEEVE, VASO, REPROC, LARGE

## (undated) DEVICE — CANISTER SUCTION 3000ML MECHANICAL FILTER AUTO SHUTOFF MEDI-VAC NONSTERILE LF DISP  (40EA/CA)

## (undated) DEVICE — TUBING SETDISPOS HIGH FLOW II - (10/BX)

## (undated) DEVICE — DRESSING TRANSPARENT FILM TEGADERM 4 X 4.75" (50EA/BX)"

## (undated) DEVICE — SUTURE 0 ETHIBOND MO6 C/R - (12/BX) 8-18 INCH ETHICON

## (undated) DEVICE — CLIP MED INTNL HRZN TI ESCP - (25/BX)

## (undated) DEVICE — SPONGE GAUZESTER. 2X2 4-PL - (2/PK 50PK/BX 30BX/CS)

## (undated) DEVICE — PACK MINOR BASIN - (2EA/CA)

## (undated) DEVICE — SET SUCTION/IRRIGATION WITH DISPOSABLE TIP (6/CA )PART #0250-070-520 IS A SUB

## (undated) DEVICE — SUTURE 3-0 30 CM X 30 CM STRATAFIX SPRIAL PS-1 NEEDLE (12/BX)

## (undated) DEVICE — GOWN WARMING STANDARD FLEX - (30/CA)

## (undated) DEVICE — TROCAR LAPSCP 100MM 12MM NTHRD - (6/BX)

## (undated) DEVICE — SPONGE GAUZESTER 4 X 4 4PLY - (128PK/CA)

## (undated) DEVICE — GLOVE BIOGEL INDICATOR SZ 7.5 SURGICAL PF LTX - (50PR/BX 4BX/CA)

## (undated) DEVICE — GOWN SURGEONS LARGE - (32/CA)

## (undated) DEVICE — SUTURE 4-0 VICRYL PLUSFS-1 - 27 INCH (36/BX)

## (undated) DEVICE — DRAPESURG STERI-DRAPE LONG - (10/BX 4BX/CA)

## (undated) DEVICE — SUTURE 2-0 VICRYL PLUS SH - 27 INCH (36/BX)

## (undated) DEVICE — STERI STRIP COMPOUND BENZOIN - TINCTURE 0.6ML WITH APPLICATOR (40EA/BX)

## (undated) DEVICE — SYRINGE 10 ML CONTROL LL (25EA/BX 4BX/CA)

## (undated) DEVICE — GOWN WARMING X-LARGE FLEX - (20/CA)

## (undated) DEVICE — NEPTUNE 4 PORT MANIFOLD - (20/PK)

## (undated) DEVICE — CLOSURE SKIN STRIP 1/2 X 4 IN - (STERI STRIP) (50/BX 4BX/CA)

## (undated) DEVICE — SUTURE 3-0 VICRYL PLUS SH - 8X 18 INCH (12/BX)

## (undated) DEVICE — SODIUM CHL IRRIGATION 0.9% 1000ML (12EA/CA)

## (undated) DEVICE — DRAPE IOBAN II INCISE 23X17 - (10EA/BX 4BX/CA)

## (undated) DEVICE — MASK ANESTHESIA ADULT  - (100/CA)

## (undated) DEVICE — DRESSING TRANSPARENT FILM TEGADERM 2.375 X 2.75"  (100EA/BX)"

## (undated) DEVICE — GLOVE BIOGEL SZ 7 SURGICAL PF LTX - (50PR/BX 4BX/CA)

## (undated) DEVICE — TUBING CLEARLINK DUO-VENT - C-FLO (48EA/CA)

## (undated) DEVICE — TROCAR Z THREAD 11 X 100 - BLADED (6/BX)

## (undated) DEVICE — SYRINGE 30 ML LL (56/BX)

## (undated) DEVICE — GLOVE SZ 7.5 BIOGEL PI MICRO - PF LF (50PR/BX)

## (undated) DEVICE — SLEEVE, VASO, THIGH, MED

## (undated) DEVICE — SHEET PEDIATRIC LAPAROTOMY - (10/CA)

## (undated) DEVICE — SUTURE 3-0 VICRYL PLUS - 12 X 18 INCH (12/BX)

## (undated) DEVICE — SYSTEM DISSECTION BALLOON  KII OVAL WITH 2 EACH 5MM LOW PROFILE TROCARS